# Patient Record
Sex: FEMALE | Race: OTHER | HISPANIC OR LATINO
[De-identification: names, ages, dates, MRNs, and addresses within clinical notes are randomized per-mention and may not be internally consistent; named-entity substitution may affect disease eponyms.]

---

## 2019-07-16 PROBLEM — Z00.00 ENCOUNTER FOR PREVENTIVE HEALTH EXAMINATION: Status: ACTIVE | Noted: 2019-07-16

## 2019-08-15 ENCOUNTER — NON-APPOINTMENT (OUTPATIENT)
Age: 29
End: 2019-08-15

## 2019-08-15 ENCOUNTER — APPOINTMENT (OUTPATIENT)
Dept: OBGYN | Facility: CLINIC | Age: 29
End: 2019-08-15
Payer: COMMERCIAL

## 2019-08-15 VITALS
SYSTOLIC BLOOD PRESSURE: 109 MMHG | DIASTOLIC BLOOD PRESSURE: 64 MMHG | WEIGHT: 149 LBS | HEART RATE: 80 BPM | OXYGEN SATURATION: 98 % | TEMPERATURE: 97.8 F | HEIGHT: 62 IN | BODY MASS INDEX: 27.42 KG/M2

## 2019-08-15 DIAGNOSIS — Z78.9 OTHER SPECIFIED HEALTH STATUS: ICD-10-CM

## 2019-08-15 PROCEDURE — 81003 URINALYSIS AUTO W/O SCOPE: CPT | Mod: QW

## 2019-08-15 PROCEDURE — 36415 COLL VENOUS BLD VENIPUNCTURE: CPT

## 2019-08-15 PROCEDURE — 99205 OFFICE O/P NEW HI 60 MIN: CPT | Mod: 25

## 2019-08-15 RX ORDER — PRENATAL VIT NO.130/IRON/FOLIC 27MG-0.8MG
28-0.8 TABLET ORAL DAILY
Qty: 30 | Refills: 11 | Status: ACTIVE | COMMUNITY
Start: 2019-08-15 | End: 1900-01-01

## 2019-08-15 RX ORDER — PNV NO.95/FERROUS FUM/FOLIC AC 28MG-0.8MG
TABLET ORAL
Refills: 0 | Status: ACTIVE | COMMUNITY

## 2019-08-19 LAB
ABO + RH PNL BLD: NORMAL
BASOPHILS # BLD AUTO: 0.04 K/UL
BASOPHILS NFR BLD AUTO: 0.4 %
BLD GP AB SCN SERPL QL: NORMAL
EOSINOPHIL # BLD AUTO: 0.09 K/UL
EOSINOPHIL NFR BLD AUTO: 1 %
GLUCOSE 1H P 50 G GLC PO SERPL-MCNC: 136 MG/DL
HCT VFR BLD CALC: 37.7 %
HGB BLD-MCNC: 12.3 G/DL
IMM GRANULOCYTES NFR BLD AUTO: 0.9 %
LYMPHOCYTES # BLD AUTO: 1.74 K/UL
LYMPHOCYTES NFR BLD AUTO: 19.6 %
MAN DIFF?: NORMAL
MCHC RBC-ENTMCNC: 32.5 PG
MCHC RBC-ENTMCNC: 32.6 GM/DL
MCV RBC AUTO: 99.7 FL
MONOCYTES # BLD AUTO: 0.6 K/UL
MONOCYTES NFR BLD AUTO: 6.7 %
NEUTROPHILS # BLD AUTO: 6.34 K/UL
NEUTROPHILS NFR BLD AUTO: 71.4 %
PLATELET # BLD AUTO: 266 K/UL
RBC # BLD: 3.78 M/UL
RBC # FLD: 12.8 %
WBC # FLD AUTO: 8.89 K/UL

## 2019-08-20 ENCOUNTER — OTHER (OUTPATIENT)
Age: 29
End: 2019-08-20

## 2019-08-20 ENCOUNTER — ASOB RESULT (OUTPATIENT)
Age: 29
End: 2019-08-20

## 2019-08-20 ENCOUNTER — APPOINTMENT (OUTPATIENT)
Dept: ANTEPARTUM | Facility: CLINIC | Age: 29
End: 2019-08-20
Payer: COMMERCIAL

## 2019-08-20 PROCEDURE — 76820 UMBILICAL ARTERY ECHO: CPT

## 2019-08-20 PROCEDURE — 76816 OB US FOLLOW-UP PER FETUS: CPT

## 2019-08-20 PROCEDURE — 76819 FETAL BIOPHYS PROFIL W/O NST: CPT

## 2019-08-21 RX ORDER — PNV NO.95/FERROUS FUM/FOLIC AC 28MG-0.8MG
27-0.8 TABLET ORAL DAILY
Qty: 30 | Refills: 9 | Status: ACTIVE | COMMUNITY
Start: 2019-08-21 | End: 1900-01-01

## 2019-08-29 ENCOUNTER — APPOINTMENT (OUTPATIENT)
Dept: OBGYN | Facility: CLINIC | Age: 29
End: 2019-08-29
Payer: COMMERCIAL

## 2019-08-29 ENCOUNTER — NON-APPOINTMENT (OUTPATIENT)
Age: 29
End: 2019-08-29

## 2019-08-29 VITALS
WEIGHT: 152 LBS | BODY MASS INDEX: 27.97 KG/M2 | DIASTOLIC BLOOD PRESSURE: 61 MMHG | HEIGHT: 62 IN | SYSTOLIC BLOOD PRESSURE: 89 MMHG

## 2019-08-29 DIAGNOSIS — Z3A.29 29 WEEKS GESTATION OF PREGNANCY: ICD-10-CM

## 2019-08-29 PROCEDURE — 36415 COLL VENOUS BLD VENIPUNCTURE: CPT

## 2019-08-29 PROCEDURE — 81003 URINALYSIS AUTO W/O SCOPE: CPT | Mod: QW

## 2019-08-29 PROCEDURE — 0502F SUBSEQUENT PRENATAL CARE: CPT | Mod: 25

## 2019-09-05 ENCOUNTER — ASOB RESULT (OUTPATIENT)
Age: 29
End: 2019-09-05

## 2019-09-05 ENCOUNTER — APPOINTMENT (OUTPATIENT)
Dept: ANTEPARTUM | Facility: CLINIC | Age: 29
End: 2019-09-05
Payer: COMMERCIAL

## 2019-09-05 PROCEDURE — 76816 OB US FOLLOW-UP PER FETUS: CPT

## 2019-09-09 LAB
M TB IFN-G BLD-IMP: NEGATIVE
QUANTIFERON TB PLUS MITOGEN MINUS NIL: >10 IU/ML
QUANTIFERON TB PLUS NIL: 0.01 IU/ML
QUANTIFERON TB PLUS TB1 MINUS NIL: 0 IU/ML
QUANTIFERON TB PLUS TB2 MINUS NIL: 0.02 IU/ML

## 2019-09-12 ENCOUNTER — ASOB RESULT (OUTPATIENT)
Age: 29
End: 2019-09-12

## 2019-09-12 ENCOUNTER — APPOINTMENT (OUTPATIENT)
Dept: ANTEPARTUM | Facility: CLINIC | Age: 29
End: 2019-09-12
Payer: COMMERCIAL

## 2019-09-12 ENCOUNTER — APPOINTMENT (OUTPATIENT)
Dept: OBGYN | Facility: CLINIC | Age: 29
End: 2019-09-12
Payer: COMMERCIAL

## 2019-09-12 ENCOUNTER — NON-APPOINTMENT (OUTPATIENT)
Age: 29
End: 2019-09-12

## 2019-09-12 VITALS
DIASTOLIC BLOOD PRESSURE: 62 MMHG | BODY MASS INDEX: 28.52 KG/M2 | HEIGHT: 62 IN | SYSTOLIC BLOOD PRESSURE: 100 MMHG | WEIGHT: 155 LBS

## 2019-09-12 PROCEDURE — 81003 URINALYSIS AUTO W/O SCOPE: CPT | Mod: QW

## 2019-09-12 PROCEDURE — 36415 COLL VENOUS BLD VENIPUNCTURE: CPT

## 2019-09-12 PROCEDURE — 0502F SUBSEQUENT PRENATAL CARE: CPT | Mod: 25

## 2019-09-12 PROCEDURE — 76818 FETAL BIOPHYS PROFILE W/NST: CPT

## 2019-09-13 LAB
BASOPHILS # BLD AUTO: 0.03 K/UL
BASOPHILS NFR BLD AUTO: 0.3 %
EOSINOPHIL # BLD AUTO: 0.07 K/UL
EOSINOPHIL NFR BLD AUTO: 0.7 %
GLUCOSE 1H P 100 G GLC PO SERPL-MCNC: 102 MG/DL
GLUCOSE 2H P CHAL SERPL-MCNC: 101 MG/DL
GLUCOSE 3H P CHAL SERPL-MCNC: 105 MG/DL
GLUCOSE BS SERPL-MCNC: 72 MG/DL
HCT VFR BLD CALC: 38.7 %
HGB BLD-MCNC: 12.4 G/DL
IMM GRANULOCYTES NFR BLD AUTO: 0.7 %
LYMPHOCYTES # BLD AUTO: 1.91 K/UL
LYMPHOCYTES NFR BLD AUTO: 19.8 %
MAN DIFF?: NORMAL
MCHC RBC-ENTMCNC: 32 GM/DL
MCHC RBC-ENTMCNC: 32.1 PG
MCV RBC AUTO: 100.3 FL
MEV IGG FLD QL IA: 57.7 AU/ML
MEV IGG+IGM SER-IMP: POSITIVE
MONOCYTES # BLD AUTO: 0.61 K/UL
MONOCYTES NFR BLD AUTO: 6.3 %
NEUTROPHILS # BLD AUTO: 6.94 K/UL
NEUTROPHILS NFR BLD AUTO: 72.2 %
PLATELET # BLD AUTO: 264 K/UL
RBC # BLD: 3.86 M/UL
RBC # FLD: 13.2 %
WBC # FLD AUTO: 9.63 K/UL

## 2019-09-19 ENCOUNTER — APPOINTMENT (OUTPATIENT)
Dept: ANTEPARTUM | Facility: CLINIC | Age: 29
End: 2019-09-19
Payer: MEDICAID

## 2019-09-19 ENCOUNTER — ASOB RESULT (OUTPATIENT)
Age: 29
End: 2019-09-19

## 2019-09-19 PROCEDURE — 99201 OFFICE OUTPATIENT NEW 10 MINUTES: CPT | Mod: 25

## 2019-09-19 PROCEDURE — 76816 OB US FOLLOW-UP PER FETUS: CPT

## 2019-09-26 ENCOUNTER — APPOINTMENT (OUTPATIENT)
Dept: OBGYN | Facility: CLINIC | Age: 29
End: 2019-09-26
Payer: COMMERCIAL

## 2019-09-26 ENCOUNTER — NON-APPOINTMENT (OUTPATIENT)
Age: 29
End: 2019-09-26

## 2019-09-26 VITALS
BODY MASS INDEX: 29.08 KG/M2 | SYSTOLIC BLOOD PRESSURE: 97 MMHG | WEIGHT: 158 LBS | DIASTOLIC BLOOD PRESSURE: 65 MMHG | HEIGHT: 62 IN

## 2019-09-26 DIAGNOSIS — O36.5990 MATERNAL CARE FOR OTHER KNOWN OR SUSPECTED POOR FETAL GROWTH, UNSPECIFIED TRIMESTER, NOT APPLICABLE OR UNSPECIFIED: ICD-10-CM

## 2019-09-26 DIAGNOSIS — Z3A.33 33 WEEKS GESTATION OF PREGNANCY: ICD-10-CM

## 2019-09-26 PROCEDURE — 81003 URINALYSIS AUTO W/O SCOPE: CPT | Mod: QW

## 2019-09-26 PROCEDURE — 0502F SUBSEQUENT PRENATAL CARE: CPT | Mod: 25

## 2019-10-02 ENCOUNTER — NON-APPOINTMENT (OUTPATIENT)
Age: 29
End: 2019-10-02

## 2019-10-02 ENCOUNTER — APPOINTMENT (OUTPATIENT)
Dept: OBGYN | Facility: CLINIC | Age: 29
End: 2019-10-02
Payer: COMMERCIAL

## 2019-10-02 VITALS
BODY MASS INDEX: 29.26 KG/M2 | HEIGHT: 62 IN | DIASTOLIC BLOOD PRESSURE: 61 MMHG | WEIGHT: 159 LBS | SYSTOLIC BLOOD PRESSURE: 97 MMHG

## 2019-10-02 DIAGNOSIS — Z3A.35 35 WEEKS GESTATION OF PREGNANCY: ICD-10-CM

## 2019-10-02 LAB
BASOPHILS # BLD AUTO: 0.03 K/UL
BASOPHILS NFR BLD AUTO: 0.4 %
EOSINOPHIL # BLD AUTO: 0.07 K/UL
EOSINOPHIL NFR BLD AUTO: 0.9 %
HCT VFR BLD CALC: 39 %
HGB BLD-MCNC: 12.7 G/DL
IMM GRANULOCYTES NFR BLD AUTO: 1.1 %
LYMPHOCYTES # BLD AUTO: 1.49 K/UL
LYMPHOCYTES NFR BLD AUTO: 19.9 %
MAN DIFF?: NORMAL
MCHC RBC-ENTMCNC: 32.3 PG
MCHC RBC-ENTMCNC: 32.6 GM/DL
MCV RBC AUTO: 99.2 FL
MONOCYTES # BLD AUTO: 0.74 K/UL
MONOCYTES NFR BLD AUTO: 9.9 %
NEUTROPHILS # BLD AUTO: 5.09 K/UL
NEUTROPHILS NFR BLD AUTO: 67.8 %
PLATELET # BLD AUTO: 249 K/UL
RBC # BLD: 3.93 M/UL
RBC # FLD: 13.2 %
WBC # FLD AUTO: 7.5 K/UL

## 2019-10-02 PROCEDURE — 81003 URINALYSIS AUTO W/O SCOPE: CPT | Mod: QW

## 2019-10-02 PROCEDURE — 0502F SUBSEQUENT PRENATAL CARE: CPT | Mod: 25

## 2019-10-03 LAB
C TRACH RRNA SPEC QL NAA+PROBE: NOT DETECTED
HIV1+2 AB SPEC QL IA.RAPID: NONREACTIVE
N GONORRHOEA RRNA SPEC QL NAA+PROBE: NOT DETECTED
SOURCE AMPLIFICATION: NORMAL
T PALLIDUM AB SER QL IA: NEGATIVE

## 2019-10-04 LAB
GP B STREP DNA SPEC QL NAA+PROBE: NORMAL
GP B STREP DNA SPEC QL NAA+PROBE: NOT DETECTED
SOURCE GBS: NORMAL

## 2019-10-10 ENCOUNTER — APPOINTMENT (OUTPATIENT)
Dept: ANTEPARTUM | Facility: CLINIC | Age: 29
End: 2019-10-10
Payer: COMMERCIAL

## 2019-10-10 ENCOUNTER — ASOB RESULT (OUTPATIENT)
Age: 29
End: 2019-10-10

## 2019-10-10 PROCEDURE — 76816 OB US FOLLOW-UP PER FETUS: CPT

## 2019-10-11 ENCOUNTER — APPOINTMENT (OUTPATIENT)
Dept: OBGYN | Facility: CLINIC | Age: 29
End: 2019-10-11
Payer: COMMERCIAL

## 2019-10-11 ENCOUNTER — NON-APPOINTMENT (OUTPATIENT)
Age: 29
End: 2019-10-11

## 2019-10-11 VITALS
BODY MASS INDEX: 29.63 KG/M2 | WEIGHT: 161 LBS | HEIGHT: 62 IN | DIASTOLIC BLOOD PRESSURE: 57 MMHG | SYSTOLIC BLOOD PRESSURE: 87 MMHG

## 2019-10-11 DIAGNOSIS — Z3A.36 36 WEEKS GESTATION OF PREGNANCY: ICD-10-CM

## 2019-10-11 PROCEDURE — 0502F SUBSEQUENT PRENATAL CARE: CPT | Mod: 25

## 2019-10-11 PROCEDURE — 81003 URINALYSIS AUTO W/O SCOPE: CPT | Mod: QW

## 2019-10-18 ENCOUNTER — APPOINTMENT (OUTPATIENT)
Dept: OBGYN | Facility: CLINIC | Age: 29
End: 2019-10-18
Payer: COMMERCIAL

## 2019-10-18 ENCOUNTER — NON-APPOINTMENT (OUTPATIENT)
Age: 29
End: 2019-10-18

## 2019-10-18 VITALS
WEIGHT: 164 LBS | SYSTOLIC BLOOD PRESSURE: 91 MMHG | BODY MASS INDEX: 30.18 KG/M2 | DIASTOLIC BLOOD PRESSURE: 58 MMHG | HEIGHT: 62 IN

## 2019-10-18 DIAGNOSIS — Z3A.37 37 WEEKS GESTATION OF PREGNANCY: ICD-10-CM

## 2019-10-18 PROCEDURE — 0502F SUBSEQUENT PRENATAL CARE: CPT | Mod: 25

## 2019-10-18 PROCEDURE — 81003 URINALYSIS AUTO W/O SCOPE: CPT | Mod: QW

## 2019-10-25 ENCOUNTER — NON-APPOINTMENT (OUTPATIENT)
Age: 29
End: 2019-10-25

## 2019-10-25 ENCOUNTER — APPOINTMENT (OUTPATIENT)
Dept: OBGYN | Facility: CLINIC | Age: 29
End: 2019-10-25
Payer: MEDICAID

## 2019-10-25 VITALS
HEIGHT: 62 IN | DIASTOLIC BLOOD PRESSURE: 61 MMHG | SYSTOLIC BLOOD PRESSURE: 97 MMHG | WEIGHT: 166 LBS | BODY MASS INDEX: 30.55 KG/M2

## 2019-10-25 DIAGNOSIS — Z3A.38 38 WEEKS GESTATION OF PREGNANCY: ICD-10-CM

## 2019-10-25 PROCEDURE — 90471 IMMUNIZATION ADMIN: CPT

## 2019-10-25 PROCEDURE — 81003 URINALYSIS AUTO W/O SCOPE: CPT | Mod: QW

## 2019-10-25 PROCEDURE — 0502F SUBSEQUENT PRENATAL CARE: CPT | Mod: 25

## 2019-10-25 PROCEDURE — 90658 IIV3 VACCINE SPLT 0.5 ML IM: CPT

## 2019-10-30 ENCOUNTER — APPOINTMENT (OUTPATIENT)
Dept: ANTEPARTUM | Facility: CLINIC | Age: 29
End: 2019-10-30
Payer: MEDICAID

## 2019-10-30 ENCOUNTER — ASOB RESULT (OUTPATIENT)
Age: 29
End: 2019-10-30

## 2019-10-30 PROCEDURE — 76816 OB US FOLLOW-UP PER FETUS: CPT

## 2019-10-31 ENCOUNTER — APPOINTMENT (OUTPATIENT)
Dept: ANTEPARTUM | Facility: CLINIC | Age: 29
End: 2019-10-31

## 2019-11-01 ENCOUNTER — APPOINTMENT (OUTPATIENT)
Dept: ANTEPARTUM | Facility: CLINIC | Age: 29
End: 2019-11-01
Payer: MEDICAID

## 2019-11-01 ENCOUNTER — NON-APPOINTMENT (OUTPATIENT)
Age: 29
End: 2019-11-01

## 2019-11-01 ENCOUNTER — ASOB RESULT (OUTPATIENT)
Age: 29
End: 2019-11-01

## 2019-11-01 ENCOUNTER — APPOINTMENT (OUTPATIENT)
Dept: OBGYN | Facility: CLINIC | Age: 29
End: 2019-11-01
Payer: MEDICAID

## 2019-11-01 VITALS
DIASTOLIC BLOOD PRESSURE: 78 MMHG | SYSTOLIC BLOOD PRESSURE: 122 MMHG | WEIGHT: 166 LBS | BODY MASS INDEX: 30.55 KG/M2 | HEIGHT: 62 IN

## 2019-11-01 DIAGNOSIS — Z3A.39 39 WEEKS GESTATION OF PREGNANCY: ICD-10-CM

## 2019-11-01 PROCEDURE — 0502F SUBSEQUENT PRENATAL CARE: CPT | Mod: 25

## 2019-11-01 PROCEDURE — 81003 URINALYSIS AUTO W/O SCOPE: CPT | Mod: QW

## 2019-11-01 PROCEDURE — 76818 FETAL BIOPHYS PROFILE W/NST: CPT

## 2019-11-02 ENCOUNTER — OUTPATIENT (OUTPATIENT)
Dept: OUTPATIENT SERVICES | Facility: HOSPITAL | Age: 29
LOS: 1 days | End: 2019-11-02
Payer: COMMERCIAL

## 2019-11-02 DIAGNOSIS — O26.899 OTHER SPECIFIED PREGNANCY RELATED CONDITIONS, UNSPECIFIED TRIMESTER: ICD-10-CM

## 2019-11-02 DIAGNOSIS — Z3A.00 WEEKS OF GESTATION OF PREGNANCY NOT SPECIFIED: ICD-10-CM

## 2019-11-02 PROCEDURE — 59025 FETAL NON-STRESS TEST: CPT | Mod: 26

## 2019-11-02 PROCEDURE — 59025 FETAL NON-STRESS TEST: CPT

## 2019-11-02 PROCEDURE — G0463: CPT

## 2019-11-03 ENCOUNTER — OUTPATIENT (OUTPATIENT)
Dept: OUTPATIENT SERVICES | Facility: HOSPITAL | Age: 29
LOS: 1 days | End: 2019-11-03
Payer: MEDICAID

## 2019-11-03 DIAGNOSIS — Z3A.00 WEEKS OF GESTATION OF PREGNANCY NOT SPECIFIED: ICD-10-CM

## 2019-11-03 DIAGNOSIS — O26.899 OTHER SPECIFIED PREGNANCY RELATED CONDITIONS, UNSPECIFIED TRIMESTER: ICD-10-CM

## 2019-11-03 PROCEDURE — 59025 FETAL NON-STRESS TEST: CPT | Mod: 26

## 2019-11-04 ENCOUNTER — INPATIENT (INPATIENT)
Facility: HOSPITAL | Age: 29
LOS: 2 days | Discharge: HOME | End: 2019-11-07
Attending: OBSTETRICS & GYNECOLOGY | Admitting: OBSTETRICS & GYNECOLOGY
Payer: MEDICAID

## 2019-11-04 ENCOUNTER — RESULT REVIEW (OUTPATIENT)
Age: 29
End: 2019-11-04

## 2019-11-04 VITALS — HEIGHT: 62 IN | WEIGHT: 160.94 LBS

## 2019-11-04 DIAGNOSIS — Z34.80 ENCOUNTER FOR SUPERVISION OF OTHER NORMAL PREGNANCY, UNSPECIFIED TRIMESTER: ICD-10-CM

## 2019-11-04 DIAGNOSIS — Z3A.00 WEEKS OF GESTATION OF PREGNANCY NOT SPECIFIED: ICD-10-CM

## 2019-11-04 DIAGNOSIS — O26.899 OTHER SPECIFIED PREGNANCY RELATED CONDITIONS, UNSPECIFIED TRIMESTER: ICD-10-CM

## 2019-11-04 LAB
APTT BLD: 32.1 SEC — SIGNIFICANT CHANGE UP (ref 27.5–36.3)
BASOPHILS # BLD AUTO: 0.03 K/UL — SIGNIFICANT CHANGE UP (ref 0–0.2)
BASOPHILS NFR BLD AUTO: 0.2 % — SIGNIFICANT CHANGE UP (ref 0–2)
BLD GP AB SCN SERPL QL: SIGNIFICANT CHANGE UP
EOSINOPHIL # BLD AUTO: 0 K/UL — SIGNIFICANT CHANGE UP (ref 0–0.5)
EOSINOPHIL NFR BLD AUTO: 0 % — SIGNIFICANT CHANGE UP (ref 0–6)
FIBRINOGEN PPP-MCNC: 822 MG/DL — HIGH (ref 350–510)
HBV SURFACE AG SER-ACNC: SIGNIFICANT CHANGE UP
HCT VFR BLD CALC: 40.3 % — SIGNIFICANT CHANGE UP (ref 34.5–45)
HGB BLD-MCNC: 13.5 G/DL — SIGNIFICANT CHANGE UP (ref 11.5–15.5)
IMM GRANULOCYTES NFR BLD AUTO: 0.7 % — SIGNIFICANT CHANGE UP (ref 0–1.5)
INR BLD: 0.97 RATIO — SIGNIFICANT CHANGE UP (ref 0.88–1.16)
LYMPHOCYTES # BLD AUTO: 1.56 K/UL — SIGNIFICANT CHANGE UP (ref 1–3.3)
LYMPHOCYTES # BLD AUTO: 10.4 % — LOW (ref 13–44)
MCHC RBC-ENTMCNC: 32 PG — SIGNIFICANT CHANGE UP (ref 27–34)
MCHC RBC-ENTMCNC: 33.5 GM/DL — SIGNIFICANT CHANGE UP (ref 32–36)
MCV RBC AUTO: 95.5 FL — SIGNIFICANT CHANGE UP (ref 80–100)
MONOCYTES # BLD AUTO: 0.82 K/UL — SIGNIFICANT CHANGE UP (ref 0–0.9)
MONOCYTES NFR BLD AUTO: 5.5 % — SIGNIFICANT CHANGE UP (ref 2–14)
NEUTROPHILS # BLD AUTO: 12.42 K/UL — HIGH (ref 1.8–7.4)
NEUTROPHILS NFR BLD AUTO: 83.2 % — HIGH (ref 43–77)
NRBC # BLD: 0 /100 WBCS — SIGNIFICANT CHANGE UP (ref 0–0)
PLATELET # BLD AUTO: 263 K/UL — SIGNIFICANT CHANGE UP (ref 150–400)
PROTHROM AB SERPL-ACNC: 10.8 SEC — SIGNIFICANT CHANGE UP (ref 10–12.9)
RBC # BLD: 4.22 M/UL — SIGNIFICANT CHANGE UP (ref 3.8–5.2)
RBC # FLD: 13.2 % — SIGNIFICANT CHANGE UP (ref 10.3–14.5)
RUBV IGG SER-ACNC: 9.3 INDEX — SIGNIFICANT CHANGE UP
RUBV IGG SER-IMP: POSITIVE — SIGNIFICANT CHANGE UP
T PALLIDUM AB TITR SER: NEGATIVE — SIGNIFICANT CHANGE UP
WBC # BLD: 14.93 K/UL — HIGH (ref 3.8–10.5)
WBC # FLD AUTO: 14.93 K/UL — HIGH (ref 3.8–10.5)

## 2019-11-04 RX ORDER — CITRIC ACID/SODIUM CITRATE 300-500 MG
15 SOLUTION, ORAL ORAL EVERY 6 HOURS
Refills: 0 | Status: DISCONTINUED | OUTPATIENT
Start: 2019-11-04 | End: 2019-11-05

## 2019-11-04 RX ORDER — OXYTOCIN 10 UNIT/ML
2 VIAL (ML) INJECTION
Qty: 30 | Refills: 0 | Status: DISCONTINUED | OUTPATIENT
Start: 2019-11-04 | End: 2019-11-05

## 2019-11-04 RX ORDER — SODIUM CHLORIDE 9 MG/ML
1000 INJECTION, SOLUTION INTRAVENOUS
Refills: 0 | Status: DISCONTINUED | OUTPATIENT
Start: 2019-11-04 | End: 2019-11-05

## 2019-11-04 RX ORDER — OXYTOCIN 10 UNIT/ML
2 VIAL (ML) INJECTION
Qty: 30 | Refills: 0 | Status: DISCONTINUED | OUTPATIENT
Start: 2019-11-04 | End: 2019-11-04

## 2019-11-04 RX ORDER — SODIUM CHLORIDE 9 MG/ML
500 INJECTION INTRAMUSCULAR; INTRAVENOUS; SUBCUTANEOUS ONCE
Refills: 0 | Status: COMPLETED | OUTPATIENT
Start: 2019-11-04 | End: 2019-11-04

## 2019-11-04 RX ORDER — OXYTOCIN 10 UNIT/ML
333.33 VIAL (ML) INJECTION
Qty: 20 | Refills: 0 | Status: COMPLETED | OUTPATIENT
Start: 2019-11-04 | End: 2019-11-04

## 2019-11-04 RX ORDER — SODIUM CHLORIDE 9 MG/ML
1000 INJECTION, SOLUTION INTRAVENOUS ONCE
Refills: 0 | Status: COMPLETED | OUTPATIENT
Start: 2019-11-04 | End: 2019-11-04

## 2019-11-04 RX ADMIN — SODIUM CHLORIDE 125 MILLILITER(S): 9 INJECTION, SOLUTION INTRAVENOUS at 05:00

## 2019-11-04 RX ADMIN — Medication 2 MILLIUNIT(S)/MIN: at 07:24

## 2019-11-04 RX ADMIN — SODIUM CHLORIDE 1000 MILLILITER(S): 9 INJECTION, SOLUTION INTRAVENOUS at 04:30

## 2019-11-04 RX ADMIN — SODIUM CHLORIDE 1000 MILLILITER(S): 9 INJECTION INTRAMUSCULAR; INTRAVENOUS; SUBCUTANEOUS at 12:05

## 2019-11-04 RX ADMIN — SODIUM CHLORIDE 125 MILLILITER(S): 9 INJECTION, SOLUTION INTRAVENOUS at 23:04

## 2019-11-05 PROCEDURE — 59409 OBSTETRICAL CARE: CPT | Mod: U9

## 2019-11-05 PROCEDURE — 88307 TISSUE EXAM BY PATHOLOGIST: CPT | Mod: 26

## 2019-11-05 RX ORDER — MAGNESIUM HYDROXIDE 400 MG/1
30 TABLET, CHEWABLE ORAL
Refills: 0 | Status: DISCONTINUED | OUTPATIENT
Start: 2019-11-05 | End: 2019-11-07

## 2019-11-05 RX ORDER — AER TRAVELER 0.5 G/1
1 SOLUTION RECTAL; TOPICAL EVERY 4 HOURS
Refills: 0 | Status: DISCONTINUED | OUTPATIENT
Start: 2019-11-05 | End: 2019-11-07

## 2019-11-05 RX ORDER — GENTAMICIN SULFATE 40 MG/ML
80 VIAL (ML) INJECTION ONCE
Refills: 0 | Status: COMPLETED | OUTPATIENT
Start: 2019-11-05 | End: 2019-11-05

## 2019-11-05 RX ORDER — SIMETHICONE 80 MG/1
80 TABLET, CHEWABLE ORAL EVERY 4 HOURS
Refills: 0 | Status: DISCONTINUED | OUTPATIENT
Start: 2019-11-05 | End: 2019-11-07

## 2019-11-05 RX ORDER — SODIUM CHLORIDE 9 MG/ML
3 INJECTION INTRAMUSCULAR; INTRAVENOUS; SUBCUTANEOUS EVERY 8 HOURS
Refills: 0 | Status: DISCONTINUED | OUTPATIENT
Start: 2019-11-05 | End: 2019-11-07

## 2019-11-05 RX ORDER — DIPHENHYDRAMINE HCL 50 MG
25 CAPSULE ORAL EVERY 6 HOURS
Refills: 0 | Status: DISCONTINUED | OUTPATIENT
Start: 2019-11-05 | End: 2019-11-07

## 2019-11-05 RX ORDER — OXYCODONE AND ACETAMINOPHEN 5; 325 MG/1; MG/1
1 TABLET ORAL EVERY 4 HOURS
Refills: 0 | Status: DISCONTINUED | OUTPATIENT
Start: 2019-11-05 | End: 2019-11-07

## 2019-11-05 RX ORDER — BENZOCAINE 10 %
1 GEL (GRAM) MUCOUS MEMBRANE EVERY 6 HOURS
Refills: 0 | Status: DISCONTINUED | OUTPATIENT
Start: 2019-11-05 | End: 2019-11-07

## 2019-11-05 RX ORDER — GLYCERIN ADULT
1 SUPPOSITORY, RECTAL RECTAL AT BEDTIME
Refills: 0 | Status: DISCONTINUED | OUTPATIENT
Start: 2019-11-05 | End: 2019-11-07

## 2019-11-05 RX ORDER — PRAMOXINE HYDROCHLORIDE 150 MG/15G
1 AEROSOL, FOAM RECTAL EVERY 4 HOURS
Refills: 0 | Status: DISCONTINUED | OUTPATIENT
Start: 2019-11-05 | End: 2019-11-07

## 2019-11-05 RX ORDER — HYDROCORTISONE 1 %
1 OINTMENT (GRAM) TOPICAL EVERY 6 HOURS
Refills: 0 | Status: DISCONTINUED | OUTPATIENT
Start: 2019-11-05 | End: 2019-11-07

## 2019-11-05 RX ORDER — LANOLIN
1 OINTMENT (GRAM) TOPICAL EVERY 6 HOURS
Refills: 0 | Status: DISCONTINUED | OUTPATIENT
Start: 2019-11-05 | End: 2019-11-07

## 2019-11-05 RX ORDER — IBUPROFEN 200 MG
600 TABLET ORAL EVERY 6 HOURS
Refills: 0 | Status: DISCONTINUED | OUTPATIENT
Start: 2019-11-05 | End: 2019-11-07

## 2019-11-05 RX ORDER — DIBUCAINE 1 %
1 OINTMENT (GRAM) RECTAL EVERY 6 HOURS
Refills: 0 | Status: DISCONTINUED | OUTPATIENT
Start: 2019-11-05 | End: 2019-11-07

## 2019-11-05 RX ORDER — AZITHROMYCIN 500 MG/1
500 TABLET, FILM COATED ORAL ONCE
Refills: 0 | Status: COMPLETED | OUTPATIENT
Start: 2019-11-05 | End: 2019-11-05

## 2019-11-05 RX ORDER — ACETAMINOPHEN 500 MG
975 TABLET ORAL EVERY 6 HOURS
Refills: 0 | Status: DISCONTINUED | OUTPATIENT
Start: 2019-11-05 | End: 2019-11-07

## 2019-11-05 RX ORDER — TETANUS TOXOID, REDUCED DIPHTHERIA TOXOID AND ACELLULAR PERTUSSIS VACCINE, ADSORBED 5; 2.5; 8; 8; 2.5 [IU]/.5ML; [IU]/.5ML; UG/.5ML; UG/.5ML; UG/.5ML
0.5 SUSPENSION INTRAMUSCULAR ONCE
Refills: 0 | Status: DISCONTINUED | OUTPATIENT
Start: 2019-11-05 | End: 2019-11-07

## 2019-11-05 RX ORDER — MAGNESIUM HYDROXIDE 400 MG/1
30 TABLET, CHEWABLE ORAL
Refills: 0 | Status: DISCONTINUED | OUTPATIENT
Start: 2019-11-05 | End: 2019-11-05

## 2019-11-05 RX ORDER — SENNA PLUS 8.6 MG/1
2 TABLET ORAL AT BEDTIME
Refills: 0 | Status: DISCONTINUED | OUTPATIENT
Start: 2019-11-05 | End: 2019-11-07

## 2019-11-05 RX ADMIN — AZITHROMYCIN 255 MILLIGRAM(S): 500 TABLET, FILM COATED ORAL at 01:14

## 2019-11-05 RX ADMIN — Medication 100 MILLIGRAM(S): at 00:45

## 2019-11-05 RX ADMIN — Medication 600 MILLIGRAM(S): at 23:42

## 2019-11-05 RX ADMIN — Medication 975 MILLIGRAM(S): at 05:50

## 2019-11-05 RX ADMIN — Medication 15 MILLILITER(S): at 00:45

## 2019-11-05 RX ADMIN — Medication 1000 MILLIUNIT(S)/MIN: at 01:38

## 2019-11-05 RX ADMIN — Medication 975 MILLIGRAM(S): at 05:08

## 2019-11-05 RX ADMIN — MAGNESIUM HYDROXIDE 30 MILLILITER(S): 400 TABLET, CHEWABLE ORAL at 18:19

## 2019-11-05 RX ADMIN — SODIUM CHLORIDE 3 MILLILITER(S): 9 INJECTION INTRAMUSCULAR; INTRAVENOUS; SUBCUTANEOUS at 06:28

## 2019-11-05 RX ADMIN — Medication 0.2 MILLIGRAM(S): at 01:40

## 2019-11-05 RX ADMIN — Medication 600 MILLIGRAM(S): at 22:55

## 2019-11-05 RX ADMIN — Medication 1 TABLET(S): at 12:16

## 2019-11-05 RX ADMIN — SODIUM CHLORIDE 3 MILLILITER(S): 9 INJECTION INTRAMUSCULAR; INTRAVENOUS; SUBCUTANEOUS at 22:56

## 2019-11-05 RX ADMIN — SENNA PLUS 2 TABLET(S): 8.6 TABLET ORAL at 22:51

## 2019-11-05 RX ADMIN — Medication 104 MILLIGRAM(S): at 00:52

## 2019-11-05 RX ADMIN — Medication 1 SPRAY(S): at 05:06

## 2019-11-06 ENCOUNTER — TRANSCRIPTION ENCOUNTER (OUTPATIENT)
Age: 29
End: 2019-11-06

## 2019-11-06 LAB
BASOPHILS # BLD AUTO: 0 K/UL — SIGNIFICANT CHANGE UP (ref 0–0.2)
BASOPHILS NFR BLD AUTO: 0 % — SIGNIFICANT CHANGE UP (ref 0–2)
EOSINOPHIL # BLD AUTO: 0.22 K/UL — SIGNIFICANT CHANGE UP (ref 0–0.5)
EOSINOPHIL NFR BLD AUTO: 1 % — SIGNIFICANT CHANGE UP (ref 0–6)
HCT VFR BLD CALC: 29.9 % — LOW (ref 34.5–45)
HGB BLD-MCNC: 10 G/DL — LOW (ref 11.5–15.5)
LYMPHOCYTES # BLD AUTO: 10 % — LOW (ref 13–44)
LYMPHOCYTES # BLD AUTO: 2.24 K/UL — SIGNIFICANT CHANGE UP (ref 1–3.3)
MANUAL SMEAR VERIFICATION: SIGNIFICANT CHANGE UP
MCHC RBC-ENTMCNC: 32.6 PG — SIGNIFICANT CHANGE UP (ref 27–34)
MCHC RBC-ENTMCNC: 33.4 GM/DL — SIGNIFICANT CHANGE UP (ref 32–36)
MCV RBC AUTO: 97.4 FL — SIGNIFICANT CHANGE UP (ref 80–100)
MONOCYTES # BLD AUTO: 1.34 K/UL — HIGH (ref 0–0.9)
MONOCYTES NFR BLD AUTO: 6 % — SIGNIFICANT CHANGE UP (ref 2–14)
NEUTROPHILS # BLD AUTO: 18.56 K/UL — HIGH (ref 1.8–7.4)
NEUTROPHILS NFR BLD AUTO: 82 % — HIGH (ref 43–77)
NEUTS BAND # BLD: 1 % — SIGNIFICANT CHANGE UP (ref 0–8)
NRBC # BLD: 0 /100 — SIGNIFICANT CHANGE UP (ref 0–0)
PLAT MORPH BLD: NORMAL — SIGNIFICANT CHANGE UP
PLATELET # BLD AUTO: 219 K/UL — SIGNIFICANT CHANGE UP (ref 150–400)
RBC # BLD: 3.07 M/UL — LOW (ref 3.8–5.2)
RBC # FLD: 13.7 % — SIGNIFICANT CHANGE UP (ref 10.3–14.5)
RBC BLD AUTO: NORMAL — SIGNIFICANT CHANGE UP
WBC # BLD: 22.36 K/UL — HIGH (ref 3.8–10.5)
WBC # FLD AUTO: 22.36 K/UL — HIGH (ref 3.8–10.5)

## 2019-11-06 RX ORDER — FERROUS SULFATE 325(65) MG
1 TABLET ORAL
Qty: 90 | Refills: 0
Start: 2019-11-06 | End: 2019-12-05

## 2019-11-06 RX ORDER — IBUPROFEN 200 MG
1 TABLET ORAL
Qty: 20 | Refills: 0
Start: 2019-11-06

## 2019-11-06 RX ORDER — DOCUSATE SODIUM 100 MG
1 CAPSULE ORAL
Qty: 60 | Refills: 0
Start: 2019-11-06 | End: 2019-12-05

## 2019-11-06 RX ADMIN — SODIUM CHLORIDE 3 MILLILITER(S): 9 INJECTION INTRAMUSCULAR; INTRAVENOUS; SUBCUTANEOUS at 14:02

## 2019-11-06 RX ADMIN — Medication 1 TABLET(S): at 11:07

## 2019-11-06 RX ADMIN — Medication 600 MILLIGRAM(S): at 06:10

## 2019-11-06 RX ADMIN — Medication 600 MILLIGRAM(S): at 14:00

## 2019-11-06 RX ADMIN — Medication 600 MILLIGRAM(S): at 15:00

## 2019-11-06 RX ADMIN — Medication 600 MILLIGRAM(S): at 05:18

## 2019-11-06 RX ADMIN — MAGNESIUM HYDROXIDE 30 MILLILITER(S): 400 TABLET, CHEWABLE ORAL at 05:18

## 2019-11-06 RX ADMIN — SODIUM CHLORIDE 3 MILLILITER(S): 9 INJECTION INTRAMUSCULAR; INTRAVENOUS; SUBCUTANEOUS at 05:18

## 2019-11-06 NOTE — DISCHARGE NOTE OB - CARE PLAN
Principal Discharge DX:	Normal spontaneous vaginal delivery  Goal:	postpartum care, pain mngt, breastfeeding education  Assessment and plan of treatment:	no sex, nothing in the vagina for 6 weeks, no strenuous activity, continue prenatal vitamins while breastfeeding, motrin prn for pain, f/u in the office in 5-6 weeks for postpartum visit Principal Discharge DX:	Normal spontaneous vaginal delivery  Goal:	postpartum care, pain mngt, breastfeeding education  Assessment and plan of treatment:	no sex, nothing in the vagina for 6 weeks, no strenuous activity, continue prenatal vitamins while breastfeeding, motrin prn for pain, f/u in the office in 5-6 weeks for postpartum visit  Secondary Diagnosis:	Acute blood loss anemia  Assessment and plan of treatment:	continue iron supplement 3x/day with daily prenatal vitamin  follow up cbc in office at postpartum visit

## 2019-11-06 NOTE — PROGRESS NOTE ADULT - PROBLEM SELECTOR PLAN 1
-Continue pain management  -Encourage OOB and ambulation  -Check CBC in am   -Continue current care  -Plan for discharge tomorrow  -d/w dr. Lanza

## 2019-11-06 NOTE — DISCHARGE NOTE OB - PROVIDER TOKENS
FREE:[LAST:[[facility]],PHONE:[phone]],FAX:[(   )    -],ADDRESS:[[facility]  [address] [place] [address]]]

## 2019-11-06 NOTE — DISCHARGE NOTE OB - PLAN OF CARE
postpartum care, pain mngt, breastfeeding education no sex, nothing in the vagina for 6 weeks, no strenuous activity, continue prenatal vitamins while breastfeeding, motrin prn for pain, f/u in the office in 5-6 weeks for postpartum visit continue iron supplement 3x/day with daily prenatal vitamin  follow up cbc in office at postpartum visit

## 2019-11-06 NOTE — DISCHARGE NOTE OB - MATERIALS PROVIDED
[facility] Hearing Screen Program/Breastfeeding Guide and Packet/Birth Certificate Instructions/Discharge Medication Information for Patients and Families Pocket Guide/Letter of Medical Neccessity/[place]  Immunization Record/Vaccinations/[facility]  Screening Program/Breastfeeding Mother’s Support Group Information/Guide to Postpartum Care/Back To Sleep Handout/Shaken Baby Prevention Handout

## 2019-11-06 NOTE — DISCHARGE NOTE OB - PATIENT PORTAL LINK FT
You can access the FollowMyHealth Patient Portal offered by [facility] by registering at the following website: [email or web address]. By joining [facility]’s FollowMyHealth portal, you will also be able to view your health information using other applications (apps) compatible with our system.

## 2019-11-06 NOTE — DISCHARGE NOTE OB - CARE PROVIDER_API CALL
[facility],   [facility]  [address]  Phone: [phone]  Fax: (   )    -  Follow Up Time:

## 2019-11-06 NOTE — PROGRESS NOTE ADULT - SUBJECTIVE AND OBJECTIVE BOX
Patient seen at bedside resting comfortably offers no current complaints. Ambulating and voiding without difficulty.  Passing flatus and tolerating regular diet.  both breast/bottle feeding . Denies HA, CP, SOB, N/V/D, dizziness, palpitations, worsening abdominal pain, worsening vaginal bleeding, or any other concerns.    Vital Signs Last 24 Hrs  T(C): 36.3 (2019 05:54), Max: 36.8 (2019 21:34)  T(F): 97.3 (2019 05:54), Max: 98.2 (2019 21:34)  HR: 92 (2019 05:54) (86 - 102)  BP: 106/69 (2019 05:54) (103/79 - 118/71)  BP(mean): --  RR: 16 (2019 05:54) (16 - 16)  SpO2: 99% (2019 05:54) (98% - 99%)    Physical Exam:     Gen: A&Ox 3, NAD  Chest: CTA B/L  Cardiac: S1+S2; RRR  Breast: Soft, nontender, nonengorged  Abdomen: +Bs; Soft, nontender,  ND; Fundus firm below umbilicus  Gyn: mod lochia, intact/repaired  Ext: Nontender, DTRS 2+, trace edema                          10.0   22.36 )-----------( 219      ( 2019 06:53 )             29.9       A/P: 29 year old PPD#1 s/p     -Continue pain management  -Encourage OOB and ambulation  -Check CBC in am   -Continue current care  -Plan for discharge tomorrow  -d/w dr. Lanza

## 2019-11-07 VITALS
OXYGEN SATURATION: 98 % | RESPIRATION RATE: 17 BRPM | HEART RATE: 76 BPM | SYSTOLIC BLOOD PRESSURE: 101 MMHG | DIASTOLIC BLOOD PRESSURE: 63 MMHG | TEMPERATURE: 98 F

## 2019-11-07 LAB
BASOPHILS # BLD AUTO: 0.05 K/UL — SIGNIFICANT CHANGE UP (ref 0–0.2)
BASOPHILS NFR BLD AUTO: 0.3 % — SIGNIFICANT CHANGE UP (ref 0–2)
EOSINOPHIL # BLD AUTO: 0.17 K/UL — SIGNIFICANT CHANGE UP (ref 0–0.5)
EOSINOPHIL NFR BLD AUTO: 1.1 % — SIGNIFICANT CHANGE UP (ref 0–6)
HCT VFR BLD CALC: 26.5 % — LOW (ref 34.5–45)
HGB BLD-MCNC: 8.8 G/DL — LOW (ref 11.5–15.5)
IMM GRANULOCYTES NFR BLD AUTO: 1 % — SIGNIFICANT CHANGE UP (ref 0–1.5)
LYMPHOCYTES # BLD AUTO: 13.6 % — SIGNIFICANT CHANGE UP (ref 13–44)
LYMPHOCYTES # BLD AUTO: 2.16 K/UL — SIGNIFICANT CHANGE UP (ref 1–3.3)
MCHC RBC-ENTMCNC: 32.6 PG — SIGNIFICANT CHANGE UP (ref 27–34)
MCHC RBC-ENTMCNC: 33.2 GM/DL — SIGNIFICANT CHANGE UP (ref 32–36)
MCV RBC AUTO: 98.1 FL — SIGNIFICANT CHANGE UP (ref 80–100)
MONOCYTES # BLD AUTO: 1.04 K/UL — HIGH (ref 0–0.9)
MONOCYTES NFR BLD AUTO: 6.6 % — SIGNIFICANT CHANGE UP (ref 2–14)
NEUTROPHILS # BLD AUTO: 12.26 K/UL — HIGH (ref 1.8–7.4)
NEUTROPHILS NFR BLD AUTO: 77.4 % — HIGH (ref 43–77)
NRBC # BLD: 0 /100 WBCS — SIGNIFICANT CHANGE UP (ref 0–0)
PLATELET # BLD AUTO: 253 K/UL — SIGNIFICANT CHANGE UP (ref 150–400)
RBC # BLD: 2.7 M/UL — LOW (ref 3.8–5.2)
RBC # FLD: 13.8 % — SIGNIFICANT CHANGE UP (ref 10.3–14.5)
WBC # BLD: 15.84 K/UL — HIGH (ref 3.8–10.5)
WBC # FLD AUTO: 15.84 K/UL — HIGH (ref 3.8–10.5)

## 2019-11-07 PROCEDURE — 88307 TISSUE EXAM BY PATHOLOGIST: CPT

## 2019-11-07 PROCEDURE — 86923 COMPATIBILITY TEST ELECTRIC: CPT

## 2019-11-07 PROCEDURE — 85384 FIBRINOGEN ACTIVITY: CPT

## 2019-11-07 PROCEDURE — 85027 COMPLETE CBC AUTOMATED: CPT

## 2019-11-07 PROCEDURE — 85730 THROMBOPLASTIN TIME PARTIAL: CPT

## 2019-11-07 PROCEDURE — G0463: CPT

## 2019-11-07 PROCEDURE — 85610 PROTHROMBIN TIME: CPT

## 2019-11-07 PROCEDURE — 86900 BLOOD TYPING SEROLOGIC ABO: CPT

## 2019-11-07 PROCEDURE — 86901 BLOOD TYPING SEROLOGIC RH(D): CPT

## 2019-11-07 PROCEDURE — 86762 RUBELLA ANTIBODY: CPT

## 2019-11-07 PROCEDURE — 59025 FETAL NON-STRESS TEST: CPT

## 2019-11-07 PROCEDURE — 36415 COLL VENOUS BLD VENIPUNCTURE: CPT

## 2019-11-07 PROCEDURE — 59050 FETAL MONITOR W/REPORT: CPT

## 2019-11-07 PROCEDURE — 86780 TREPONEMA PALLIDUM: CPT

## 2019-11-07 PROCEDURE — 86850 RBC ANTIBODY SCREEN: CPT

## 2019-11-07 PROCEDURE — 87340 HEPATITIS B SURFACE AG IA: CPT

## 2019-11-07 RX ADMIN — Medication 600 MILLIGRAM(S): at 09:00

## 2019-11-07 RX ADMIN — Medication 600 MILLIGRAM(S): at 00:45

## 2019-11-07 RX ADMIN — Medication 600 MILLIGRAM(S): at 08:09

## 2019-11-07 RX ADMIN — Medication 1 SPRAY(S): at 08:12

## 2019-11-07 RX ADMIN — Medication 600 MILLIGRAM(S): at 00:14

## 2019-11-07 NOTE — PROGRESS NOTE ADULT - SUBJECTIVE AND OBJECTIVE BOX
Patient evaluated at bedside, offers no acute complaints.  Resting comfortably with baby at bedside.  Tolerating regular diet, Voiding without dffficulty; +flatus, -BM  Currently breast and bottlefeeding.  Denies fever/chills, nausea/vomiting, headache, dizziness, chest pains, shortness of breath, palpitations    Vital Signs Last 24 Hrs  T(C): 36.7 (07 Nov 2019 05:28), Max: 36.7 (06 Nov 2019 20:15)  T(F): 98 (07 Nov 2019 05:28), Max: 98.1 (06 Nov 2019 20:15)  HR: 76 (07 Nov 2019 05:28) (76 - 86)  BP: 101/63 (07 Nov 2019 05:28) (101/63 - 107/69)  RR: 17 (07 Nov 2019 05:28) (16 - 17)  SpO2: 98% (07 Nov 2019 05:28) (98% - 100%)    PE: Pt appears comfortable, NAD, AAOx3  Chest: CTA bilaterally, no W/R/R  Cardiac: RRR  Breasts: soft, NT/nonengorged bilaterally; no masses, no erythema  Abd: soft; NT; no guarding or rebound; +BS x4 quad; Fundus firm NT below umbilicus  Gyn: mild lochia  Ext: soft, NT; DTRs 2+ bilaterally; no worsening edema                          8.8    15.84 )-----------( 253      ( 07 Nov 2019 06:58 )             26.5       evaluated with Dr. Rodriguez

## 2019-12-13 ENCOUNTER — APPOINTMENT (OUTPATIENT)
Dept: OBGYN | Facility: CLINIC | Age: 29
End: 2019-12-13
Payer: COMMERCIAL

## 2019-12-13 VITALS
HEART RATE: 85 BPM | BODY MASS INDEX: 25.03 KG/M2 | OXYGEN SATURATION: 98 % | HEIGHT: 62 IN | SYSTOLIC BLOOD PRESSURE: 104 MMHG | WEIGHT: 136 LBS | DIASTOLIC BLOOD PRESSURE: 69 MMHG

## 2019-12-13 DIAGNOSIS — Z34.93 ENCOUNTER FOR SUPERVISION OF NORMAL PREGNANCY, UNSPECIFIED, THIRD TRIMESTER: ICD-10-CM

## 2019-12-13 PROCEDURE — 0503F POSTPARTUM CARE VISIT: CPT

## 2019-12-13 NOTE — HISTORY OF PRESENT ILLNESS
[Postpartum Follow Up] : postpartum follow up [Delivery Date: ___] : on [unfilled] [Complications:___] : no complications [Last Pap Date: ___] : Last Pap Date: [unfilled] [Breastfeeding] : currently nursing [] : delivered by vaginal delivery [Resumed Menses] : has resumed her menses [Intended Contraception] : Intended Contraception: [Resumed [place]] : has not resumed intercourse [Normal] : the vagina was normal [Back to Normal] : is back to normal in size [No Sign of Infection] : is showing no signs of infection [Doing Well] : is doing well [Healing Well] : is healing well [Examination Of The Breasts] : breasts are normal [Excellent Pain Control] : has excellent pain control [None] : None [FreeTextEntry8] : patient here for 6weeks postpartum exam. reports since delivery she has left foot numbness and pain and is unable to freely dorsiflex or planterflex. she has been having difficulty walking due to this. she is otherwise doing well. voiding, tolerating diet, +BM. reports her vb was spotting then 4 days ago started bleeding heavy like period again. changed 4 pads per day at heaviest.  [de-identified] : [] left foot numbness and motor function abnormalities- neurology  [] birth control counseling provided. risks of short interval pregnancy discussed. r/b/a of different methods discussed. pt would like nexplanon. CHANCE Jeronimo tasked to book patient for insertion   [] prolonged lochia vs. period- CBC, bhcg

## 2019-12-16 LAB
BASOPHILS # BLD AUTO: 0.05 K/UL
BASOPHILS NFR BLD AUTO: 0.8 %
EOSINOPHIL # BLD AUTO: 0.12 K/UL
EOSINOPHIL NFR BLD AUTO: 1.9 %
HCG SERPL-MCNC: <1 MIU/ML
HCT VFR BLD CALC: 42.6 %
HGB BLD-MCNC: 13.4 G/DL
IMM GRANULOCYTES NFR BLD AUTO: 0.2 %
LYMPHOCYTES # BLD AUTO: 2.99 K/UL
LYMPHOCYTES NFR BLD AUTO: 46.6 %
MAN DIFF?: NORMAL
MCHC RBC-ENTMCNC: 31.5 GM/DL
MCHC RBC-ENTMCNC: 31.8 PG
MCV RBC AUTO: 101.2 FL
MONOCYTES # BLD AUTO: 0.47 K/UL
MONOCYTES NFR BLD AUTO: 7.3 %
NEUTROPHILS # BLD AUTO: 2.77 K/UL
NEUTROPHILS NFR BLD AUTO: 43.2 %
PLATELET # BLD AUTO: 418 K/UL
RBC # BLD: 4.21 M/UL
RBC # FLD: 12.6 %
WBC # FLD AUTO: 6.41 K/UL

## 2019-12-17 ENCOUNTER — APPOINTMENT (OUTPATIENT)
Dept: NEUROLOGY | Facility: CLINIC | Age: 29
End: 2019-12-17
Payer: MEDICAID

## 2019-12-17 VITALS
DIASTOLIC BLOOD PRESSURE: 72 MMHG | SYSTOLIC BLOOD PRESSURE: 124 MMHG | HEART RATE: 93 BPM | OXYGEN SATURATION: 99 % | HEIGHT: 62 IN | BODY MASS INDEX: 25.03 KG/M2 | WEIGHT: 136 LBS

## 2019-12-17 DIAGNOSIS — M54.16 RADICULOPATHY, LUMBAR REGION: ICD-10-CM

## 2019-12-17 DIAGNOSIS — R20.8 OTHER DISTURBANCES OF SKIN SENSATION: ICD-10-CM

## 2019-12-17 DIAGNOSIS — Z80.9 FAMILY HISTORY OF MALIGNANT NEOPLASM, UNSPECIFIED: ICD-10-CM

## 2019-12-17 PROCEDURE — 99202 OFFICE O/P NEW SF 15 MIN: CPT

## 2019-12-17 RX ORDER — TETANUS TOXOID, REDUCED DIPHTHERIA TOXOID AND ACELLULAR PERTUSSIS VACCINE, ADSORBED 5; 2.5; 8; 8; 2.5 [IU]/.5ML; [IU]/.5ML; UG/.5ML; UG/.5ML; UG/.5ML
5-2.5-18.5 SUSPENSION INTRAMUSCULAR
Qty: 1 | Refills: 0 | Status: DISCONTINUED | COMMUNITY
Start: 2019-08-29 | End: 2019-12-17

## 2019-12-17 NOTE — ASSESSMENT
[FreeTextEntry1] : This is a 29 woman with a history of left foot drop\par \par She will proceed at this present time with an EMG in the left lower extremity she will have a muscle ultrasound study and physical therapy if her symptoms should continue I will then obtain an MRI of her lumbar spine looking for evidence of lumbar disc abnormality to account for her left footdrop she will simply see me for followup.

## 2019-12-17 NOTE — HISTORY OF PRESENT ILLNESS
[FreeTextEntry1] : This is a 29 a woman status post pregnancy who reports left foot drop she said that she has numbness and pain involving the left foot difficulty using her left leg and has difficulty walking comfort around the region of the left knee and minimal discomfort involving her low back she has no similar complaints of the right lower extremity or problems in the upper back. She denies any swelling involving her legs.

## 2019-12-17 NOTE — PHYSICAL EXAM
[FreeTextEntry1] : On examination she does wake up and alert as  on the phone for the entire visit.\par \par Upper extremities appear to be normal in the left lower extremity she appears to have weakness predominantly in the peroneal distribution with only borderline reduced left ankle jerk sensation appears to be diminished in the left L5 distribution is minimal tenderness over the lateral aspect of the left leg at the region of the knee position and vibratory sense appear to be normal in both lower extremities pulses appear to be intact.\par \par The gait is abnormal she appears to have a left foot drop. Upper extremities cranial nerves normal.

## 2019-12-27 ENCOUNTER — APPOINTMENT (OUTPATIENT)
Dept: OBGYN | Facility: CLINIC | Age: 29
End: 2019-12-27
Payer: MEDICAID

## 2019-12-27 VITALS
DIASTOLIC BLOOD PRESSURE: 71 MMHG | BODY MASS INDEX: 25.03 KG/M2 | HEIGHT: 62 IN | HEART RATE: 86 BPM | WEIGHT: 136 LBS | OXYGEN SATURATION: 99 % | SYSTOLIC BLOOD PRESSURE: 111 MMHG | TEMPERATURE: 97.9 F

## 2019-12-27 DIAGNOSIS — Z30.017 ENCOUNTER FOR INITIAL PRESCRIPTION OF IMPLANTABLE SUBDERMAL CONTRACEPTIVE: ICD-10-CM

## 2019-12-27 PROCEDURE — 11981 INSERTION DRUG DLVR IMPLANT: CPT

## 2019-12-31 ENCOUNTER — APPOINTMENT (OUTPATIENT)
Dept: PHYSICAL MEDICINE AND REHAB | Facility: CLINIC | Age: 29
End: 2019-12-31

## 2020-03-04 NOTE — PATIENT PROFILE OB - TEACHING/LEARNING FACTORS IMPACT ABILITY TO LEARN
----- Message from Parvin Howard sent at 3/4/2020  2:05 PM CST -----  Contact: Angelica with Dr Llanos  Type: Needs Medical Advice    Who Called:  Angelica with Dr Llanos  Symptoms (please be specific):    How long has patient had these symptoms:    Pharmacy name and phone #:    Best Call Back Number: [phone]  Additional Information: Angelica is calling regarding the medical clearance needed for patient's surgery. patient is scheduled for 03/12/20. Please fax to [phone]. Please call Angelica with any questions. Thanks!    
Message sent to provider all ready. I will follow up tomorrow.   
language

## 2021-10-18 NOTE — DISCHARGE NOTE OB - TOBACCO CESSATION EDUCATION/COUNSELLING(PROVIDED IF TOBACCO USED IN THE PAST 30 DAYS- CORE MEASURE SITES)
Call your healthcare provider right away if you get any of the following signs or symptoms of bleeding/clotting problems:   * Pain, redness, swelling, or temperature changes to any part of your body   * Sudden weakness, numbness or tingling    * Chest pain or shortness in breath   * Sudden onset of slurred speech/inability to speak and/or facial droop or visual changes   * Headaches, dizziness, faint-feeling, or weakness   * Unusual bruising (unexplained or growing in size)   * Nosebleeds or bleeding gums   * Bleeding from cuts that take a long time to stop   * Menstrual bleeding or vaginal bleeding that is heavier than normal   * Pink or brown urine, red or black stools   * Coughing up blood   * Vomiting blood or material that looks like coffee grounds    Update Anticoagulation Clinic (Coumadin Clinic) with any of the following:   * Medication changes (new, stopped or dose changes, this includes over-the-counter medications and supplements)   * Planning on having any surgery, medical or dental procedures   * Diet changes   * Falls  (you should go to Emergency Department immediately for evaluation, then notify Anticoagulation Clinic)    Please, do not wait until your next appointment to update us on changes.       Offered and provided